# Patient Record
Sex: MALE | Race: WHITE | NOT HISPANIC OR LATINO | URBAN - NONMETROPOLITAN AREA
[De-identification: names, ages, dates, MRNs, and addresses within clinical notes are randomized per-mention and may not be internally consistent; named-entity substitution may affect disease eponyms.]

---

## 2018-10-24 NOTE — PATIENT DISCUSSION
PHOTOGRAPHS: I have reviewed the external ocular photographs of this patient which show the following: mild dermatochalasis of both upper eyelids, moderate dermatochalasis of both lower lids, and a lesion on the left lower eyelid.

## 2018-10-24 NOTE — PATIENT DISCUSSION
[facility] Visual Field 36 point screen: I have reviewed the visual fields both taped and untaped on this patient which demonstrate significant obstruction of the patient's peripheral visual field on both eyes.

## 2019-02-08 NOTE — PATIENT DISCUSSION
Patient is continuing to heal well following surgery. Reassured her that the redness will continue to fade over time. Reviewed skin care and sun avoidance. Eye cream given. Follow up in 6 weeks for continued observation.

## 2019-04-16 ENCOUNTER — IMPORTED ENCOUNTER (OUTPATIENT)
Dept: URBAN - NONMETROPOLITAN AREA CLINIC 1 | Facility: CLINIC | Age: 59
End: 2019-04-16

## 2019-04-16 PROBLEM — H25.13: Noted: 2018-01-03

## 2019-04-16 PROBLEM — H35.033: Noted: 2019-04-16

## 2019-04-16 PROBLEM — E11.9: Noted: 2018-01-03

## 2019-04-16 PROBLEM — H52.4: Noted: 2018-01-03

## 2019-04-16 PROCEDURE — 92015 DETERMINE REFRACTIVE STATE: CPT

## 2019-04-16 PROCEDURE — 92014 COMPRE OPH EXAM EST PT 1/>: CPT

## 2019-04-16 NOTE — PATIENT DISCUSSION
Hyperopia Presbyopia Astigmatism OU- Discussed refractive status with patient- New glasses Rx given today- Optos done today shows tortuosity OU. Recommend having blood pressure checked by GP - Continue to [place]al[place] OU- Discussed diagnosis in detail with patient- Discussed signs and symptoms of progression- Discussed UV protection- No treatment needed at this time - Continue to monitorTING (2010 )- Discussed diagnosis in detail with patient- Stressed importance of good blood sugar control- Recommend no soda's - Patient reports last A1C is 7.0 and last blood sugar was 222- No diabetic retinopathy seen on today's exam- Letter to Dr. Sandra Doe at [facility]- Continue to monitorHypertensive Retinopathy OU (Mild)- Discussed diagnosis in detail with patient- Optos done today shows tortuosity OU recommend patient being checked by GP. Patient understands- Consider Carotid doppler by GP- Patient has a history of mini strokes and was told that her had a narrowing of the left side previously - Continue to monitor; Dr's Notes: Ignacio Oh [phone] [place] [id number] 4/16/19 4/16/19

## 2019-05-03 NOTE — PATIENT DISCUSSION
BOTOX for COSMETIC (25 units): The patient presented with multiple facial rhytids after informed consent the patient was treated with Botox at a dosage documented on Integreview in this cart. The patient tolerated the procedure well.

## 2019-09-18 ENCOUNTER — IMPORTED ENCOUNTER (OUTPATIENT)
Dept: URBAN - NONMETROPOLITAN AREA CLINIC 1 | Facility: CLINIC | Age: 59
End: 2019-09-18

## 2019-09-18 PROBLEM — E11.9: Noted: 2019-09-18

## 2019-09-18 PROBLEM — H16.223: Noted: 2020-12-19

## 2019-09-18 PROBLEM — H52.4: Noted: 2020-12-19

## 2019-09-18 PROBLEM — H25.13: Noted: 2019-09-18

## 2019-09-18 PROBLEM — H35.033: Noted: 2019-09-18

## 2019-09-18 PROBLEM — H52.4: Noted: 2019-09-18

## 2019-09-18 PROBLEM — H16.223: Noted: 2019-09-18

## 2019-09-18 PROCEDURE — 99213 OFFICE O/P EST LOW 20 MIN: CPT

## 2019-09-18 NOTE — PATIENT DISCUSSION
ANTWAN OS / allergies - Discussed diagnosis in detail with patient- (-) Lypnodes - Start cool compresses through out the day - Start Tobradex QID OS Rx sent to pharmacy - Discussed signs and symptoms of progression- Recommend patient drinking plenty of water and starting Omega 3’s - Recommend Refresh or Systane  throughout the day- Consider Restasis or plugs in the future if no improvement- Continue to monitor-----------------------------previous notes---------------------------Hyperopia Presbyopia Astigmatism OU- Discussed refractive status with patient- New glasses Rx given today- Optos done today shows tortuosity OU. Recommend having blood pressure checked by GP - Continue to [place] OU- Discussed diagnosis in detail with patient- Discussed signs and symptoms of progression- Discussed UV protection- No treatment needed at this time - Continue to monitorTING (2010 )- Discussed diagnosis in detail with patient- Stressed importance of good blood sugar control- Recommend no soda's - Patient reports last A1C is 7.0 and last blood sugar was 222- No diabetic retinopathy seen on today's exam- Letter to Dr. Joe Lomas at [facility]- Continue to monitorHypertensive Retinopathy OU (Mild)- Discussed diagnosis in detail with patient- Optos done today shows tortuosity OU recommend patient being checked by GP. Patient understands- Consider Carotid doppler by GP- Patient has a history of mini strokes and was told that her had a narrowing of the left side previously - Continue to monitor; Dr's Notes: Yisroel Gong Dr. Leopold Blackbird [phone] [place] [id number] 4/16/19MR 4/16/19

## 2019-10-18 NOTE — PATIENT DISCUSSION
BOTOX for COSMETIC (35 units): The patient presented with multiple facial rhytids after informed consent the patient was treated with Botox at a dosage documented on Integreview in this cart. The patient tolerated the procedure well.

## 2020-10-29 NOTE — PATIENT DISCUSSION
BOTOX for COSMETIC (50 units): The patient presented with multiple facial rhytids after informed consent the patient was treated with Botox at a dosage documented on Integreview in this chart. The patient tolerated the procedure well.

## 2020-11-20 NOTE — PATIENT DISCUSSION
BOTOX for COSMETIC (2.5 units): The patient presented with multiple facial rhytids after informed consent the patient was treated with Botox at a dosage documented on Integreview in this cart. The patient tolerated the procedure well.

## 2020-12-19 ENCOUNTER — IMPORTED ENCOUNTER (OUTPATIENT)
Dept: URBAN - NONMETROPOLITAN AREA CLINIC 1 | Facility: CLINIC | Age: 60
End: 2020-12-19

## 2020-12-19 PROBLEM — H16.223: Noted: 2020-12-19

## 2020-12-19 PROBLEM — H35.033: Noted: 2019-09-18

## 2020-12-19 PROBLEM — H25.13: Noted: 2019-09-18

## 2020-12-19 PROBLEM — H52.4: Noted: 2020-12-19

## 2020-12-19 PROBLEM — E11.9: Noted: 2019-09-18

## 2020-12-19 PROCEDURE — 92015 DETERMINE REFRACTIVE STATE: CPT

## 2020-12-19 PROCEDURE — 92014 COMPRE OPH EXAM EST PT 1/>: CPT

## 2020-12-19 NOTE — PATIENT DISCUSSION
ANTWAN OS / allergies - Discussed diagnosis in detail with patieny- Discussed signs and symptoms of progression- Recommend patient drinking plenty of water and starting Omega 3’s - Recommend Refresh or Systane  throughout the day- Continue Tobradex when needed - Continue to monitorHyperopia Presbyopia Astigmatism OU- Discussed refractive status with patient- New glasses Rx given today Continue to monitorCombined cataracts OU- Discussed diagnosis in detail with patient- Discussed signs and symptoms of progression- Discussed UV protection- No treatment needed at this time - Continue to monitorNIDDM (2010 )- Discussed diagnosis in detail with patient- Stressed importance of good blood sugar control- Recommend no soda's - No diabetic retinopathy seen on today's exam- Letter to Dr. Sandra Doe at [facility]- Continue to monitorHypertensive Retinopathy OU (Mild)- Discussed diagnosis in detail with patient- Optos done previously shows tortuosity OU recommend patient being checked by GP. Patient understands- Consider Carotid doppler by GP- Patient has a history of mini strokes and was told that her had a narrowing of the left side previously - Continue to monitor; Dr's Notes: Ignacio Oh [phone] [place] [id number] 4/16/19MR 4/16/19

## 2021-10-07 NOTE — PATIENT DISCUSSION
Discussed r/b of botox injections in detail with the patient today.  Patient expressed understanding and wishes to proceed today.

## 2021-10-07 NOTE — PROCEDURE NOTE: CLINICAL
PROCEDURE NOTE: Botox, Cosmetic #50 The patient presented with multiple facial rhytids after informed consent the patient was treated with Botox at a dosage documented. .  The patient tolerated the procedure well. Elba Gallagher

## 2021-11-09 NOTE — PATIENT DISCUSSION
Discussed r/b of chemical peel today.  Patient expressed understanding and wishes to proceed w/ the chemical peel at this time.  The peel was performed without difficulty today.  Follow up prn.

## 2022-01-27 NOTE — PATIENT DISCUSSION
Patient is here for 3 For Me Laser Resurfacing. Discussed risks and benefits of procedure, patient expressed understanding and wishes to proceed today.

## 2022-04-09 ASSESSMENT — VISUAL ACUITY
OD_SC: 20/20-2
OD_SC: 20/20
OS_SC: 20/25+
OS_SC: 20/25
OS_SC: 20/20-
OU_CC: 20/20
OD_SC: 20/20

## 2022-04-09 ASSESSMENT — TONOMETRY
OS_IOP_MMHG: 12
OD_IOP_MMHG: 14
OD_IOP_MMHG: 12
OS_IOP_MMHG: 14
OS_IOP_MMHG: 11
OD_IOP_MMHG: 10

## 2022-04-14 NOTE — PROCEDURE NOTE: CLINICAL
PROCEDURE NOTE: Botox, Cosmetic #50 Prior to treatment, the risks/benefits/alternatives were discussed. The patient wished to proceed with procedure. Refer to template for location and amounts of injections. Botox was injected at each site without complications. Patient tolerated procedure well. There were no complications. Post procedure instructions given. Jaleesa Her

## 2022-04-14 NOTE — PATIENT DISCUSSION
Discussed r/b of botox injections today.  Patient expressed understanding and wishes to proceed.  Injections were performed without difficulty.

## 2022-08-23 ENCOUNTER — ESTABLISHED PATIENT (OUTPATIENT)
Dept: URBAN - NONMETROPOLITAN AREA CLINIC 1 | Facility: CLINIC | Age: 62
End: 2022-08-23

## 2022-08-23 DIAGNOSIS — H52.4: ICD-10-CM

## 2022-08-23 PROCEDURE — 92014 COMPRE OPH EXAM EST PT 1/>: CPT

## 2022-08-23 PROCEDURE — 92015 DETERMINE REFRACTIVE STATE: CPT

## 2022-08-23 ASSESSMENT — TONOMETRY
OS_IOP_MMHG: 13
OD_IOP_MMHG: 12

## 2022-08-23 ASSESSMENT — VISUAL ACUITY
OS_CC: 20/30+1
OD_CC: 20/20-1

## 2022-08-23 NOTE — PATIENT DISCUSSION
Discussed diagnosis in detail with patient. Discussed signs and symptoms of progression. Recommend patient drinking plenty of water and starting Omega 3’s. Recommend Refresh or Systane throughout the day. Continue Tobradex when needed. Continue to monitor.

## 2022-08-23 NOTE — PATIENT DISCUSSION
Discussed diagnosis in detail with patient-. Optos done previously shows tortuosity OU recommend patient being checked by GP. Patient understands. Consider Carotid doppler by GP. Patient has a history of mini strokes and was told that her had a narrowing of the left side previously. Continue to monitor.

## 2023-11-21 ENCOUNTER — ESTABLISHED PATIENT (OUTPATIENT)
Dept: URBAN - NONMETROPOLITAN AREA CLINIC 1 | Facility: CLINIC | Age: 63
End: 2023-11-21

## 2023-11-21 DIAGNOSIS — H52.4: ICD-10-CM

## 2023-11-21 PROCEDURE — 92014 COMPRE OPH EXAM EST PT 1/>: CPT

## 2023-11-21 PROCEDURE — 92015 DETERMINE REFRACTIVE STATE: CPT

## 2023-11-21 ASSESSMENT — VISUAL ACUITY
OU_CC: 20/30-1
OS_PH: 20/30-1
OD_CC: 20/30-1
OS_CC: 20/80

## 2023-11-21 ASSESSMENT — TONOMETRY
OD_IOP_MMHG: 12
OS_IOP_MMHG: 13

## 2024-11-22 ENCOUNTER — COMPREHENSIVE EXAM (OUTPATIENT)
Dept: URBAN - NONMETROPOLITAN AREA CLINIC 1 | Facility: CLINIC | Age: 64
End: 2024-11-22

## 2024-11-22 DIAGNOSIS — H52.4: ICD-10-CM

## 2024-11-22 PROCEDURE — S0621 ROUTINE OPHTHALMOLOGICAL EXA: HCPCS
